# Patient Record
Sex: FEMALE | Race: WHITE | NOT HISPANIC OR LATINO | URBAN - NONMETROPOLITAN AREA
[De-identification: names, ages, dates, MRNs, and addresses within clinical notes are randomized per-mention and may not be internally consistent; named-entity substitution may affect disease eponyms.]

---

## 2024-06-05 ENCOUNTER — OFFICE (OUTPATIENT)
Dept: URBAN - NONMETROPOLITAN AREA CLINIC 1 | Facility: CLINIC | Age: 59
End: 2024-06-05
Payer: MEDICAID

## 2024-06-05 VITALS
WEIGHT: 139 LBS | HEIGHT: 61 IN | HEART RATE: 77 BPM | SYSTOLIC BLOOD PRESSURE: 105 MMHG | DIASTOLIC BLOOD PRESSURE: 69 MMHG

## 2024-06-05 DIAGNOSIS — E78.5 HYPERLIPIDEMIA, UNSPECIFIED: ICD-10-CM

## 2024-06-05 DIAGNOSIS — K74.60 UNSPECIFIED CIRRHOSIS OF LIVER: ICD-10-CM

## 2024-06-05 DIAGNOSIS — Z79.02 LONG TERM (CURRENT) USE OF ANTITHROMBOTICS/ANTIPLATELETS: ICD-10-CM

## 2024-06-05 DIAGNOSIS — R11.0 NAUSEA: ICD-10-CM

## 2024-06-05 DIAGNOSIS — R56.9 UNSPECIFIED CONVULSIONS: ICD-10-CM

## 2024-06-05 DIAGNOSIS — I25.10 ATHEROSCLEROTIC HEART DISEASE OF NATIVE CORONARY ARTERY WITH: ICD-10-CM

## 2024-06-05 DIAGNOSIS — C81.90 HODGKIN LYMPHOMA, UNSPECIFIED, UNSPECIFIED SITE: ICD-10-CM

## 2024-06-05 DIAGNOSIS — J44.9 CHRONIC OBSTRUCTIVE PULMONARY DISEASE, UNSPECIFIED: ICD-10-CM

## 2024-06-05 DIAGNOSIS — E11.9 TYPE 2 DIABETES MELLITUS WITHOUT COMPLICATIONS: ICD-10-CM

## 2024-06-05 DIAGNOSIS — D50.9 IRON DEFICIENCY ANEMIA, UNSPECIFIED: ICD-10-CM

## 2024-06-05 PROCEDURE — 99204 OFFICE O/P NEW MOD 45 MIN: CPT | Performed by: NURSE PRACTITIONER

## 2024-06-05 PROCEDURE — 36415 COLL VENOUS BLD VENIPUNCTURE: CPT | Performed by: NURSE PRACTITIONER

## 2024-06-05 NOTE — SERVICENOTES
The risks for EGD and colonoscopy were discussed with her and she agrees to proceed.  
The bowel prep was prescribed and instructions were provided. 
Hold Plavix 3 days. 
 For cirrhosis management, I need to see her every 6 months for labs and imaging. 
 I will be happy to see her before that as needed.
She will continue to follow up with Dr. Lassiter for management of chronic iron-deficiency anemia.

## 2024-06-05 NOTE — SERVICEHPINOTES
Referred from Dr. Lassiter for GI evaluation of persistent anemia.  She says she wasnt able to tolerate the po iron supplement, and had an iron infusion a couple of months ago.    She says she had an EGD and colonoscopy about a year ago in [place], but wants a 2nd opinion and wants them repeated.  She is having some trouble with nausea, but no vomiting, and she does not feel nauseated every day.  No black stools or epigastric pain.  Her bowels are moving regularly and she has not seen blood with her stools.  Her chronic illnesses include COPD, diabetes, GERD, hyperlipidemia, neuropathy, seizure disorder, ischemic heart disease and severe systolic dysfunction, MI and stenting 2011, and Hodgkin's lymphoma.  Last saw Dr. Herndon in November.   She has a history of PIERRE cirrhosis, and at EGD/ colonoscopy 7/2023 she was noted to have esophageal varicosities.  She also had multiple polyps in her colon, and rectal varicosities.   She denies confusion or dizziness that would suggest hepatic encephalopathy.  No obvious abdominal ascites or fluid accumulation to her lower extremities.  Imaging is current.br br
br
br -ONCOLOGY IMPRESSION 2/26/24:br1. Stage IIIB Hodgkin lymphoma with mediastinal, upper retroperitonealbrlymphadenopathy and splenomegaly diagnosed 12/2011br2. Status post Adriamycin vinblastine and DTIC for 6 cycles with completebrremission finishing May [id number]. Peripheral neuropathy secondary to chemotherapy.br4. Local recurrence involving the right hilum and right supraclavicular areabrtreated with Gemzar and Cisplatin x3 involved field radiotherapy finishingbrJanuary [id number]. Congestive Heart Failure and history of coronary disease status post stent.br6. History of B12 and iron deficiencybr7. History of diabetes mellitus non compliantbr8. History tobacco abuse and COPDbr9. History of seizure [id number]. Status post cholecystectomy for gangrenous cholecystitis in [id number]. Fatty liver with [id number]. No advanced directives and normal sense of well [id number]. Hospitalized for C. Difficile June 28, 2016br14. Bulging disc at C6-C7 with nerve impingement causing right arm/shoulderbrweakness and [id number]. Recurrence in her right supraclavicular nodes March 2016 tx withentuximab then Opdivo Oct 2016 through Oct [id number]. Questionable foci on MRI brain, lumbar puncture negative for lymphomabrcells January 2017 - resolved on MRI 03/03/17
Reviewed her most recent CT, MRI, and PET scan again with her today.  had already called her with these results last week as well. Reviewedbrlabs from today. No findings to suggest recurrence of her lymphoma. By ourbrscales, she has not lost any weight. Did encourage her to keep her follow upsbrwith Dr. Herndon, Neurology, and her PCP in regards to her diabetes. 
br 
br Given her worsening complaints of nausea, will refer her back to [facility] forbrfurther evaluation. Does have a history of cirrhosis, and varices noted on herbrprevious EGD. Next colonoscopy due in July 2026. Will follow up on her ironbrstudies from today. If ferritin still less than 50, would recommend repeatingbranother 1-2 doses of Venofer, and she is agreeable with this plan. ContinuebrHCD, 10/325, 1 tab, PO TID PRN pain #90/0rfs - post-dated for 03/05/24. Shebrdoes have plenty of refills on her gabapentin as well. Continue port flushesbrevery 6-8 weeks. For now return to clinic in 3 months with CBC, CMP, AP,brB12/folate and ESR. She is aware to call with any other new or different issuesbrbetween visits if needed.-She underwent EGD and colonoscopy on 07/05/23. She was noted to have early to lower esophageal varicosities. Marked diffuse gastritis. She had multiple colonic polyps, with moderate rectal varicosities. Pathology was negative from EGD and ascending colon polyp was a tubular adenoma. Dr. Avila recommended she have a repeat colonoscopy again in 3 years.br visited="true"  
br -CT  Neck chest abdomen pelvis with contrast 1/22/24- 
brIMPRESSION:br1. Interval development/progression of a localized area of soft brtissue mass like opacification involving the lateral segment the right brmiddle lobe. This may represent underlying atelectatic change. brHowever, developing mass or lymphomatous infiltrate cannot be brcompletely excluded. Consider follow-up PET-CT characterization or brshort-term surveillance CT scan after appropriate clinical therapy brfor pneumonia.br2. Otherwise, no evidence for developing mass or adenopathy bridentified in the chest, abdomen, or pelvisbr3. Splenomegalybr4. Cirrhosisbr5. No evidence for osteolytic mass or metastatic diseasebr6. No evidence for mass or metastatic disease identified in the neck. br